# Patient Record
Sex: FEMALE | Race: WHITE | NOT HISPANIC OR LATINO | URBAN - METROPOLITAN AREA
[De-identification: names, ages, dates, MRNs, and addresses within clinical notes are randomized per-mention and may not be internally consistent; named-entity substitution may affect disease eponyms.]

---

## 2022-10-26 ENCOUNTER — EMERGENCY (EMERGENCY)
Facility: HOSPITAL | Age: 27
LOS: 1 days | Discharge: HOME | End: 2022-10-26
Attending: STUDENT IN AN ORGANIZED HEALTH CARE EDUCATION/TRAINING PROGRAM | Admitting: STUDENT IN AN ORGANIZED HEALTH CARE EDUCATION/TRAINING PROGRAM
Payer: COMMERCIAL

## 2022-10-26 VITALS
WEIGHT: 130.07 LBS | HEIGHT: 64 IN | TEMPERATURE: 98 F | OXYGEN SATURATION: 96 % | HEART RATE: 77 BPM | DIASTOLIC BLOOD PRESSURE: 66 MMHG | SYSTOLIC BLOOD PRESSURE: 113 MMHG | RESPIRATION RATE: 18 BRPM

## 2022-10-26 VITALS
OXYGEN SATURATION: 98 % | SYSTOLIC BLOOD PRESSURE: 119 MMHG | HEART RATE: 76 BPM | DIASTOLIC BLOOD PRESSURE: 77 MMHG | RESPIRATION RATE: 18 BRPM

## 2022-10-26 DIAGNOSIS — R10.2 PELVIC AND PERINEAL PAIN: ICD-10-CM

## 2022-10-26 DIAGNOSIS — Z97.5 PRESENCE OF (INTRAUTERINE) CONTRACEPTIVE DEVICE: ICD-10-CM

## 2022-10-26 DIAGNOSIS — Z88.2 ALLERGY STATUS TO SULFONAMIDES: ICD-10-CM

## 2022-10-26 DIAGNOSIS — N89.8 OTHER SPECIFIED NONINFLAMMATORY DISORDERS OF VAGINA: ICD-10-CM

## 2022-10-26 DIAGNOSIS — Z20.822 CONTACT WITH AND (SUSPECTED) EXPOSURE TO COVID-19: ICD-10-CM

## 2022-10-26 LAB
ALBUMIN SERPL ELPH-MCNC: 4.8 G/DL — SIGNIFICANT CHANGE UP (ref 3.3–5)
ALP SERPL-CCNC: 71 U/L — SIGNIFICANT CHANGE UP (ref 40–120)
ALT FLD-CCNC: 21 U/L — SIGNIFICANT CHANGE UP (ref 10–45)
ANION GAP SERPL CALC-SCNC: 11 MMOL/L — SIGNIFICANT CHANGE UP (ref 5–17)
APPEARANCE UR: CLEAR — SIGNIFICANT CHANGE UP
AST SERPL-CCNC: 32 U/L — SIGNIFICANT CHANGE UP (ref 10–40)
BASOPHILS # BLD AUTO: 0.05 K/UL — SIGNIFICANT CHANGE UP (ref 0–0.2)
BASOPHILS NFR BLD AUTO: 0.9 % — SIGNIFICANT CHANGE UP (ref 0–2)
BILIRUB SERPL-MCNC: 0.4 MG/DL — SIGNIFICANT CHANGE UP (ref 0.2–1.2)
BILIRUB UR-MCNC: NEGATIVE — SIGNIFICANT CHANGE UP
BUN SERPL-MCNC: 13 MG/DL — SIGNIFICANT CHANGE UP (ref 7–23)
CALCIUM SERPL-MCNC: 9.3 MG/DL — SIGNIFICANT CHANGE UP (ref 8.4–10.5)
CHLORIDE SERPL-SCNC: 107 MMOL/L — SIGNIFICANT CHANGE UP (ref 96–108)
CO2 SERPL-SCNC: 26 MMOL/L — SIGNIFICANT CHANGE UP (ref 22–31)
COLOR SPEC: YELLOW — SIGNIFICANT CHANGE UP
CREAT SERPL-MCNC: 0.67 MG/DL — SIGNIFICANT CHANGE UP (ref 0.5–1.3)
DIFF PNL FLD: NEGATIVE — SIGNIFICANT CHANGE UP
EGFR: 123 ML/MIN/1.73M2 — SIGNIFICANT CHANGE UP
EOSINOPHIL # BLD AUTO: 0.06 K/UL — SIGNIFICANT CHANGE UP (ref 0–0.5)
EOSINOPHIL NFR BLD AUTO: 1.1 % — SIGNIFICANT CHANGE UP (ref 0–6)
GLUCOSE SERPL-MCNC: 106 MG/DL — HIGH (ref 70–99)
GLUCOSE UR QL: NEGATIVE — SIGNIFICANT CHANGE UP
HCT VFR BLD CALC: 41.6 % — SIGNIFICANT CHANGE UP (ref 34.5–45)
HGB BLD-MCNC: 14.1 G/DL — SIGNIFICANT CHANGE UP (ref 11.5–15.5)
IMM GRANULOCYTES NFR BLD AUTO: 0.2 % — SIGNIFICANT CHANGE UP (ref 0–0.9)
KETONES UR-MCNC: NEGATIVE — SIGNIFICANT CHANGE UP
LEUKOCYTE ESTERASE UR-ACNC: NEGATIVE — SIGNIFICANT CHANGE UP
LIDOCAIN IGE QN: 26 U/L — SIGNIFICANT CHANGE UP (ref 7–60)
LYMPHOCYTES # BLD AUTO: 1.41 K/UL — SIGNIFICANT CHANGE UP (ref 1–3.3)
LYMPHOCYTES # BLD AUTO: 26.5 % — SIGNIFICANT CHANGE UP (ref 13–44)
MCHC RBC-ENTMCNC: 32.3 PG — SIGNIFICANT CHANGE UP (ref 27–34)
MCHC RBC-ENTMCNC: 33.9 GM/DL — SIGNIFICANT CHANGE UP (ref 32–36)
MCV RBC AUTO: 95.2 FL — SIGNIFICANT CHANGE UP (ref 80–100)
MONOCYTES # BLD AUTO: 0.29 K/UL — SIGNIFICANT CHANGE UP (ref 0–0.9)
MONOCYTES NFR BLD AUTO: 5.5 % — SIGNIFICANT CHANGE UP (ref 2–14)
NEUTROPHILS # BLD AUTO: 3.5 K/UL — SIGNIFICANT CHANGE UP (ref 1.8–7.4)
NEUTROPHILS NFR BLD AUTO: 65.8 % — SIGNIFICANT CHANGE UP (ref 43–77)
NITRITE UR-MCNC: NEGATIVE — SIGNIFICANT CHANGE UP
NRBC # BLD: 0 /100 WBCS — SIGNIFICANT CHANGE UP (ref 0–0)
PH UR: 8 — SIGNIFICANT CHANGE UP (ref 5–8)
PLATELET # BLD AUTO: 218 K/UL — SIGNIFICANT CHANGE UP (ref 150–400)
POTASSIUM SERPL-MCNC: 4.2 MMOL/L — SIGNIFICANT CHANGE UP (ref 3.5–5.3)
POTASSIUM SERPL-SCNC: 4.2 MMOL/L — SIGNIFICANT CHANGE UP (ref 3.5–5.3)
PROT SERPL-MCNC: 7.2 G/DL — SIGNIFICANT CHANGE UP (ref 6–8.3)
PROT UR-MCNC: NEGATIVE MG/DL — SIGNIFICANT CHANGE UP
RBC # BLD: 4.37 M/UL — SIGNIFICANT CHANGE UP (ref 3.8–5.2)
RBC # FLD: 12 % — SIGNIFICANT CHANGE UP (ref 10.3–14.5)
SARS-COV-2 RNA SPEC QL NAA+PROBE: NEGATIVE — SIGNIFICANT CHANGE UP
SODIUM SERPL-SCNC: 144 MMOL/L — SIGNIFICANT CHANGE UP (ref 135–145)
SP GR SPEC: 1.01 — SIGNIFICANT CHANGE UP (ref 1–1.03)
UROBILINOGEN FLD QL: 0.2 E.U./DL — SIGNIFICANT CHANGE UP
WBC # BLD: 5.32 K/UL — SIGNIFICANT CHANGE UP (ref 3.8–10.5)
WBC # FLD AUTO: 5.32 K/UL — SIGNIFICANT CHANGE UP (ref 3.8–10.5)

## 2022-10-26 PROCEDURE — 76830 TRANSVAGINAL US NON-OB: CPT | Mod: 26

## 2022-10-26 PROCEDURE — 83690 ASSAY OF LIPASE: CPT

## 2022-10-26 PROCEDURE — 96374 THER/PROPH/DIAG INJ IV PUSH: CPT

## 2022-10-26 PROCEDURE — 76856 US EXAM PELVIC COMPLETE: CPT

## 2022-10-26 PROCEDURE — 36415 COLL VENOUS BLD VENIPUNCTURE: CPT

## 2022-10-26 PROCEDURE — 76856 US EXAM PELVIC COMPLETE: CPT | Mod: 26

## 2022-10-26 PROCEDURE — 80053 COMPREHEN METABOLIC PANEL: CPT

## 2022-10-26 PROCEDURE — 99284 EMERGENCY DEPT VISIT MOD MDM: CPT | Mod: 25

## 2022-10-26 PROCEDURE — 99284 EMERGENCY DEPT VISIT MOD MDM: CPT

## 2022-10-26 PROCEDURE — 81003 URINALYSIS AUTO W/O SCOPE: CPT

## 2022-10-26 PROCEDURE — 76830 TRANSVAGINAL US NON-OB: CPT

## 2022-10-26 PROCEDURE — 85025 COMPLETE CBC W/AUTO DIFF WBC: CPT

## 2022-10-26 PROCEDURE — 87635 SARS-COV-2 COVID-19 AMP PRB: CPT

## 2022-10-26 RX ORDER — KETOROLAC TROMETHAMINE 30 MG/ML
15 SYRINGE (ML) INJECTION ONCE
Refills: 0 | Status: DISCONTINUED | OUTPATIENT
Start: 2022-10-26 | End: 2022-10-26

## 2022-10-26 RX ADMIN — Medication 15 MILLIGRAM(S): at 17:32

## 2022-10-26 NOTE — ED PROVIDER NOTE - GENITOURINARY [-], MLM
no L-sided pelvic pain/no difficulty urinating/no dysuria/no hematuria/no dysmenorrhea/no STD exposure

## 2022-10-26 NOTE — ED ADULT TRIAGE NOTE - OTHER COMPLAINTS
patient c/o R pelvic pain x 2 weeks with spotting--reports having IUD-- +nausea, denies fevers/chills/vomiting

## 2022-10-26 NOTE — ED PROVIDER NOTE - CLINICAL SUMMARY MEDICAL DECISION MAKING FREE TEXT BOX
25 y/o female presenting with constant dull R pelvic pain accompanied with intermittent episodes of cramping. Denies fever, n/v, 27 y/o female presenting with constant dull R pelvic pain accompanied with intermittent episodes of cramping. Denies fever, n/v, d/c, abdominal pain, dysuria, hematuria. Not currently sexually active. Has IUD since March & reports vaginal spotting from time to time. Upon PE, patient's abdomen is soft, non-tender, & non-distended, no TTP of L pelvis, & some TTP of the R pelvis. No CVA tenderness, no rebound tenderness. Pelvic exam shows some milky discharge at the opening of the cervix. No cervical motion tenderness present.     CBC, CMP, UA w/ cx, and pelvic U/S ordered.     UA shows no signs of infection. 25 y/o female presenting with a constant dull R pelvic pain accompanied with intermittent episodes of cramping in that area. Denies fever, n/v, d/c, abdominal pain, dysuria, hematuria. Not currently sexually active. Has IUD in place since March & reports vaginal spotting from time to time but no menstrual period since placement. Upon PE, patient's abdomen is soft, non-tender, & non-distended, no TTP of L pelvis, & some TTP of the R pelvis. No CVA tenderness, no rebound tenderness. Pelvic exam shows some milky discharge at the opening of the cervix. No cervical motion tenderness present.     CBC, CMP, UA w/ cx, and pelvic U/S ordered.     UA shows no signs of infection.

## 2022-10-26 NOTE — ED PROVIDER NOTE - PATIENT PORTAL LINK FT
You can access the FollowMyHealth Patient Portal offered by [facility] by registering at the following website: [email or web address]. By joining [employer]’s FollowMyHealth portal, you will also be able to view your health information using other applications (apps) compatible with our system.

## 2022-10-26 NOTE — ED PROVIDER NOTE - NSFOLLOWUPINSTRUCTIONS_ED_ALL_ED_FT
Follow up with your primary medical doctor as soon as possible.    Return to the emergency department if your symptoms worsen or if you develop new symptoms.    Abdominal Pain    Many things can cause abdominal pain. Many times, abdominal pain is not caused by a disease and will improve without treatment. Your health care provider will do a physical exam to determine if there is a dangerous cause of your pain; blood tests and imaging may help determine the cause of your pain. However, in many cases, no cause may be found and you may need further testing as an outpatient. Monitor your abdominal pain for any changes.     SEEK IMMEDIATE MEDICAL CARE IF YOU HAVE ANY OF THE FOLLOWING SYMPTOMS: worsening abdominal pain, uncontrollable vomiting, profuse diarrhea, inability to have bowel movements or pass gas, black or bloody stools, fever accompanying chest pain or back pain, or fainting. These symptoms may represent a serious problem that is an emergency. Do not wait to see if the symptoms will go away. Get medical help right away. Call 911 and do not drive yourself to the hospital.

## 2022-10-26 NOTE — ED PROVIDER NOTE - NS ED ATTENDING STATEMENT MOD
This was a shared visit with the FRANKLYN. I reviewed and verified the documentation and independently performed the documented:

## 2022-10-26 NOTE — ED PROVIDER NOTE - OBJECTIVE STATEMENT
27 y/o female with no PMH presents with a constant ache in the R pelvic region for the past two weeks accompanied with intermittent episodes of cramping on the R side. Has been trying ibuprofen for the pain but provides no relief. States she also noticed some slight vaginal spotting last week but has also seen spotting like this in the past due to her IUD. IUD was placed back in March & she has not gotten her period since then. Also reports having cloudy urine for the last week.     Denies fever/chillls, nausea, vomiting, CP, SOB, diarrhea, constipation, abdominal pain, dysuria, hematuria, urgency. Denies being currently sexually active, history of pregnancy, history of STDs. 27 y/o female with no PMH presents with a constant ache in the R pelvic region for the past two weeks accompanied with intermittent episodes of cramping on the R side. Has been trying ibuprofen for the pain but provides no relief. States she also noticed some slight vaginal spotting last week but has also seen spotting like this in the past due to her IUD. IUD was placed back in March & she has not gotten her period since then. Also reports having cloudy urine for the last week.     Denies fever, chills, nausea, vomiting, CP, SOB, diarrhea, constipation, abdominal pain, dysuria, hematuria, urgency. Denies being currently sexually active, history of pregnancy, or history of STDs.

## 2022-10-26 NOTE — ED PROVIDER NOTE - PROGRESS NOTE DETAILS
Re-exam with no abd ttp. US shows no abnormality that could be causing pain. Low susp of appy given time course and absent ttp.  Pt is ready for discharge and safe discharge has been established. Pt was treated for abd pain and showed improvement. Will d/c with outpatient follow-up. Strict return-precautions were given and understanding was verbalized by patient.  I have discussed the discharge plan with the patient. The patient agrees with the plan, as discussed.  The patient understands Emergency Department diagnosis is a preliminary diagnosis often based on limited information and that the patient must adhere to the follow-up plan as discussed.  The patient understands that if the symptoms worsen or if prescribed medications do not have the desired/planned effect that the patient may return to the Emergency Department at any time for further evaluation and treatment.

## 2022-10-26 NOTE — ED ADULT NURSE NOTE - OBJECTIVE STATEMENT
Pt presents to ED C/O RLQ pain/ pelvic pain with vaginal spotting, and cloudy urine. Pt has hx IUD. Denies sexual activity since last home preg test. Denies vaginal discharge. Denies N/V/D, fevers.

## 2023-09-18 NOTE — ED PROVIDER NOTE - DISCHARGE DATE
Refill request from pharmacy , it came to Dr. [email or web address]  She is schd to see you tomorrow  
26-Oct-2022

## 2025-03-03 ENCOUNTER — EMERGENCY (EMERGENCY)
Facility: HOSPITAL | Age: 30
LOS: 1 days | Discharge: HOME | End: 2025-03-03
Attending: STUDENT IN AN ORGANIZED HEALTH CARE EDUCATION/TRAINING PROGRAM | Admitting: STUDENT IN AN ORGANIZED HEALTH CARE EDUCATION/TRAINING PROGRAM
Payer: COMMERCIAL

## 2025-03-03 VITALS
OXYGEN SATURATION: 99 % | TEMPERATURE: 98 F | SYSTOLIC BLOOD PRESSURE: 116 MMHG | RESPIRATION RATE: 16 BRPM | WEIGHT: 130.07 LBS | HEIGHT: 64 IN | HEART RATE: 87 BPM | DIASTOLIC BLOOD PRESSURE: 75 MMHG

## 2025-03-03 PROCEDURE — 90471 IMMUNIZATION ADMIN: CPT

## 2025-03-03 PROCEDURE — 90675 RABIES VACCINE IM: CPT

## 2025-03-03 PROCEDURE — 90377 RABIES IG HT&SOL HUMAN IM/SC: CPT

## 2025-03-03 PROCEDURE — 99283 EMERGENCY DEPT VISIT LOW MDM: CPT | Mod: 25

## 2025-03-03 PROCEDURE — 99284 EMERGENCY DEPT VISIT MOD MDM: CPT

## 2025-03-03 PROCEDURE — 96372 THER/PROPH/DIAG INJ SC/IM: CPT

## 2025-03-03 RX ORDER — RABIES IMMUNE GLOBULIN (HUMAN) 150 [IU]/ML
1200 INJECTION INTRAMUSCULAR ONCE
Refills: 0 | Status: COMPLETED | OUTPATIENT
Start: 2025-03-03 | End: 2025-03-03

## 2025-03-03 RX ADMIN — Medication 1 MILLILITER(S): at 22:22

## 2025-03-03 RX ADMIN — RABIES IMMUNE GLOBULIN (HUMAN) 1200 UNIT(S): 150 INJECTION INTRAMUSCULAR at 22:21

## 2025-03-03 NOTE — ED ADULT TRIAGE NOTE - CHIEF COMPLAINT QUOTE
Pt requesting a rabies vaccine after a trip to [place].  Denies any bites, pt states has been around stray animals for 10 days

## 2025-03-03 NOTE — ED PROVIDER NOTE - PATIENT PORTAL LINK FT
You can access the FollowMyHealth Patient Portal offered by [facility] by registering at the following website: [email or web address]. By joining [employer]’s FollowMyHealth portal, you will also be able to view your health information using other applications (apps) compatible with our system.

## 2025-03-03 NOTE — ED ADULT NURSE NOTE - NSFALLUNIVINTERV_ED_ALL_ED
Bed/Stretcher in lowest position, wheels locked, appropriate side rails in place/Call bell, personal items and telephone in reach/Instruct patient to call for assistance before getting out of bed/chair/stretcher/Non-slip footwear applied when patient is off stretcher/[place] to call system/Physically safe environment - no spills, clutter or unnecessary equipment/Purposeful proactive rounding/Room/bathroom lighting operational, light cord in reach

## 2025-03-03 NOTE — ED PROVIDER NOTE - OBJECTIVE STATEMENT
29F denies PMH/PSH presents to ED requesting rabies vaccine. [place] [facility] last night. did not get rabies ppx ever. did not have any known exposures/bites/scratches while in [place] from any animals, however she states that "dogs were everywhere" and so she "isn't sure" if there was any exposure. no bat exposure. reports some diarrhea, denies other sx such as cp/sob/rash/laceration/abrasion/animal bite. states she got some bug bites.

## 2025-03-03 NOTE — ED PROVIDER NOTE - NS ED ROS FT
positive: diarrhea  negative: f/c/cough/cp/sob/abdominal pain/n/v/dysuria/hematuria/leg edema/rash/animal bite/abrasion

## 2025-03-03 NOTE — ED PROVIDER NOTE - PHYSICAL EXAMINATION
GENERAL:  Awake, alert and in NAD, non-toxic appearing  ENMT: Airway patent  EYES: conjunctiva clear  CARDIAC: Regular rate, regular rhythm.  Heart sounds S1, S2, no S3, S4. No murmurs, rubs or gallops.  RESPIRATORY: Breath sounds clear and equal in bilateral anterior lung fields, no wheezes/ronchi/crackles/stridor; pt breathing and speaking comfortably with no increased WOB, no accessory mm. use, no intercostal retractions, no nasal flaring  GI: abdomen soft, non-distended, non-tender, no rebound or guarding.  SKIN: warm and dry, no rashes/animal bites/bite wounds visualized  PSYCH: awake, alert, calm and cooperative  EXTREMITIES: no ble edema  NEURO: gcs 15

## 2025-03-03 NOTE — ED PROVIDER NOTE - CLINICAL SUMMARY MEDICAL DECISION MAKING FREE TEXT BOX
29F denies PMH/PSH presents to ED requesting rabies vaccine after travel to [facility], no known animal bites or scratches. On exam, VS wnl, no remarkable exam findings.    ddx: pt does not meet criteria for requiring rabies vaccine/immunoglobulin but as she is unsure if she may have been scratched or otherwise exposed at some point in her trip willing to order rabies ppx    Plan:  - Patient states she is not pregnant and would prefer to not provide a urine pregnancy test.  I explained to patient that there are certain medications or tests, including  x-rays, CTs, or other test that may involve radiation that could have an effect on a potential fetus.  Patient states that she understand and understands any risk to a potential fetus, and states that she is not pregnant and would like to move forward with any medication or testing ordered.   - rabies vaccine and immunoglobulin ordered for first time dose. told pt to return for 3 more rabies vaccine on time for schedule below

## 2025-03-03 NOTE — ED PROVIDER NOTE - NSFOLLOWUPINSTRUCTIONS_ED_ALL_ED_FT
RABIES VACCINE SCHEDULE  [X] Rabies vaccine PLUS rabies immunoglobulin 3/3/25: day zero  [   ] Rabies vaccine 3/6/25: day 3  [   ] Rabies vaccine 3/10/25: day 7  [   ] Rabies vaccine 3/17/25: day 14      Please reach out to Ru Turcios ([facility] ED clinical referral coordinator) to assist you with your follow-up appointment.     Monday - Friday 9am-5pm  [phone]  [email or web address]     1. You were seen for rabies vaccine. A copy of any of your resulted labs, imaging, and findings have been provided to you. Make sure to view any test results that may not have yet resulted at the time of your discharge by creating a FollowMyHealth account at: [email or web address] to sign up for FollowMyHealth. Please review all of your lab, imaging, and all other results in their entirety with your primary care doctor.   2. Continue to take your home medications as prescribed.   3. Follow up with your primary care doctor within 48 hours to assess the symptoms you were seen for in the emergency department and to review all results from your visit. If you don't have a doctor, call [phone] to make an appointment.  4. Return immediately to the emergency department for new, persistent, or worsening symptoms or signs. Return immediately to the emergency department if you have chest pain, shortness of breath, loss of consciousness, difficulty swallowing, drooling, or rash.   5. For your for health, you should make healthy food choices and be physically active. Also, you should not smoke or use drugs, and you should not drink alcohol in excess. Please visit [email or web address] for resources and more information.

## 2025-03-03 NOTE — ED ADULT NURSE NOTE - CHIEF COMPLAINT
Spontaneous, unlabored and symmetrical
The patient is a 29y Female complaining of see chief complaint quote.

## 2025-03-03 NOTE — ED ADULT NURSE NOTE - OBJECTIVE STATEMENT
pt received into [facility] A&Ox4 ambulatory appears comfortable arrives via walk in triage requesting rabies vax. states just spent 10 days in [place] and was around a lot of street dogs and cats. states did not get bitten by any animals but they were everywhere and may have been scratched so wants to be covered just in case. educated on the difference between rabies vax and immunoglobulin and weight based dosing.

## 2025-03-06 DIAGNOSIS — Z29.14 ENCOUNTER FOR PROPHYLACTIC RABIES IMMUNE GLOBIN: ICD-10-CM

## 2025-03-06 DIAGNOSIS — Z88.2 ALLERGY STATUS TO SULFONAMIDES: ICD-10-CM

## 2025-03-06 DIAGNOSIS — Z23 ENCOUNTER FOR IMMUNIZATION: ICD-10-CM

## 2025-03-06 DIAGNOSIS — R19.7 DIARRHEA, UNSPECIFIED: ICD-10-CM

## 2025-03-06 DIAGNOSIS — Z20.3 CONTACT WITH AND (SUSPECTED) EXPOSURE TO RABIES: ICD-10-CM
